# Patient Record
(demographics unavailable — no encounter records)

---

## 2024-11-22 NOTE — ASSESSMENT
[FreeTextEntry1] : Ms. Frank is a 58 year old female with R ADH s/p excision now on Tamoxifen that presents for routine follow up.  #ADH - s/p excision - Started on Tamoxifen 10 mg every other day, now taking 5mg daily. - Discussed risk model scores. - Previously followed by Dr. Yao with breast surgery, transitioning to Dr. Wilcox. Appt scheduled 7/2023 - Continue mammo alternating with MRI. - s/p mammo/sono 5/2022 with no mammographic or sonographic evidence of malignancy bilaterally. Reviewed. Due now. Ordered - s/p MRI Breast 1/2023 with no MRI evidence of malignancy, post surgical changes to R breast. Reviewed. MRI due 1/2024. Orders in place by Dr. Wilcox. - 5/26/23 patient states she is currently still menstruating and in interim has had heavy menses, bleeding for 3 mos at a time. She notes she did not notify heme/onc clinical team and continued with low dose Tamoxifen. She is s/p eval with Dr. Calderon, per patient TVUS, EMBx and D&C preformed with improvement to bleeding. Obtain records. Advised patient can be 2/2 Tamoxifen use. Review of records and pathology. Check hormone levels today to evaluate menopausal status. Advised to call office immediately with any new or worsening bleeding. May consider switching medications to AI pending menopausal status. Will need 5 years total of antiestrogens. VS and CBC reviewed today; WBC 9.41, hgb 11.9, plt 296. Anemia may be 2/2 bleeding. Continue to monitor. Check irons today. If persistent will check anemia panel next visit. - 11/15/23 vs and CBC reviewed; WBC 8.11, hgb 13.2, plt 256. s/p follow up with Dr. Wilcox 7/2023 note reviewed. s/p mammo/sono 7/2023 BIRADS 2 reviewed. Due for MRI 1/2024, order in by breast surgery. Continue Tamoxifen tolerating well.  - 5/22/24 -vs and labs reviewed. labs drawn in office today. wbc, hgb and plts wnl. cmp pending. patient requesting estradiol, fsh, lh. Did have some spotting recently after several months of not having it. Discussed TVUS given that she is on tamoxifen. She should also follow up with her GYN. For now can continue tamoxifen for breast cancer prevention - 11/2024 vs and labs reviewed; Cotinue tamoxifen. s/p mammo 7/2024. Due for MRI now had to reschedule. Has not had any spotting episodes. Maintain appropriate follow up with GYN currently she is transitioning to a new one. Reviewed role of TVUS to monitor uterine linging   #Vaginal Bleeding - As above, patient states she is currently still menstruating (has not gone one year without menses) - Episode of 3 mos of heavy bleeding. She notes she did not notify heme/onc clinical team and continued with low dose Tamoxifen. - She is s/p eval with Dr. Calderon, per patient TVUS, EMBx and D&C preformed with improvement to bleeding. Obtain records and review pathology. - Advised patient can be 2/2 Tamoxifen use. Currently improvement to bleeding and has continued to Tamoxifen. - Check hormone levels to evaluate menopausal status. - Advised to call office immediately with any new or worsening bleeding, may need to hold Tamoxifen. Consider switching medications to AI pending menopausal status. Will need 5 years total of antiestrogens. - Pending follow up appt with GYN Dr. Madrid 8/25/23 - 11/16/23 s/p Venofer 200mg x5 after last visit for severe iron deficiency 2/2 to heavy menses. LMP 7/29/23. Improvement s/p above. s/p follow up with GYN Dr. Madrid 9/2023 note reviewed. Repeat irons today  - 5/22/24 - Did have some spotting recently after several months of not having it. Discussed TVUS given that she is on tamoxifen. She should also follow up with her GYN.   - 11/2024 Has not had any spotting episodes. Maintain appropriate follow up with GYN currently she is transitioning to a new one. Reviewed role of TVUS to monitor uterine linging   #Iron deficiency  - 2/2 menorrhagia s/p TVUS, s/p EMBx, s/p D&C - s/p follow up with GYN 9/2023 note reviewed  - Repeat irons today  - last CNY in [place] before she moved about 4 years ago, per patient no hx of polyps. Will provide with GI referrals when she is due   #Diastolic Dysfunction - Continues to follow with Dr. Saeed [facility]. Scheduled appt 6/20/23.  #Eosinophilia - Hx of seasonal allergies - Continue to monitor  #Vit D deficiency  - Repeat levels today   RTC in 6 mos or sooner with any new or worsening s/s with CBC with diff, CMP, irons, b12/folate, vit D.

## 2024-11-22 NOTE — HISTORY OF PRESENT ILLNESS
[de-identified] : Ms. Harrison a 56 year old female who was in her usual state of health until she underwent a routine mammogram  2020.\par  \par  2020 Mammogram: Right breast calcifications require further diagnostic evaluation.\par  \par  10/9/2020- stereotactic biopsies by Dr. Yao, subsequently developed hematoma requiring aspiration\par  Right breast, upper central with calcifications, stereotactic biopsy-atypical ductal hyperplasia, columnar type, fibrocystic changes\par  Right breast, upper central without calcifications, stereotactic biopsy- atypical ductal hyperplasia, columnar type, fibrocystic changes\par  \par  2020 Right breast excisional breast biopsy- usual ductal hyperplasia, columnar cell change and pseudoangiomatous stromal hyperplasia (PASH)\par  \par  Age of Menarche: 15\par  LMP: 2020\par  OCP/HRT: denies\par  \par  \par  Smoke: denies\par  ETOH: rare\par  Illicit: denies\par  \par  Personal History: About 1 year ago required ICU admission for septic shock, has residual LEWIS and follows with cardiologist\par  \par  Family History:\par  Father- throat cancer, dx 60's\par  Paternal Grandfather- lung ca, dx 90's\par  denies family history of breast, ovarian, pancreatic CA\par  \par  Health Maintenance:\par  Last colonoscopy 2018- reports everything was normal \par  PAP within past year- reports everything was normal\par  \par  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 Has not had menses since 7/29/23 has not had any further spotting episodes. Remains on Tamoxifen. tolerating well. Denies fevers/chills, HA, dizziness, SOB, cough, CP, palpitations, abd pain, n/v/d, swelling to extremities, back pain, hematuria, BRBPR. Continues to follow with Dr. Wilcox, and has upcoming appt with new GYN monitoring uterine lining. Is currently due for breast MRI but needed to reschedule. s/p mammo/sono 7/2024.

## 2024-11-22 NOTE — HISTORY OF PRESENT ILLNESS
[de-identified] : Ms. Harrison a 56 year old female who was in her usual state of health until she underwent a routine mammogram  2020.\par  \par  2020 Mammogram: Right breast calcifications require further diagnostic evaluation.\par  \par  10/9/2020- stereotactic biopsies by Dr. Yao, subsequently developed hematoma requiring aspiration\par  Right breast, upper central with calcifications, stereotactic biopsy-atypical ductal hyperplasia, columnar type, fibrocystic changes\par  Right breast, upper central without calcifications, stereotactic biopsy- atypical ductal hyperplasia, columnar type, fibrocystic changes\par  \par  2020 Right breast excisional breast biopsy- usual ductal hyperplasia, columnar cell change and pseudoangiomatous stromal hyperplasia (PASH)\par  \par  Age of Menarche: 15\par  LMP: 2020\par  OCP/HRT: denies\par  \par  \par  Smoke: denies\par  ETOH: rare\par  Illicit: denies\par  \par  Personal History: About 1 year ago required ICU admission for septic shock, has residual LEWIS and follows with cardiologist\par  \par  Family History:\par  Father- throat cancer, dx 60's\par  Paternal Grandfather- lung ca, dx 90's\par  denies family history of breast, ovarian, pancreatic CA\par  \par  Health Maintenance:\par  Last colonoscopy 2018- reports everything was normal \par  PAP within past year- reports everything was normal\par  \par  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 Has not had menses since 7/29/23 has not had any further spotting episodes. Remains on Tamoxifen. tolerating well. Denies fevers/chills, HA, dizziness, SOB, cough, CP, palpitations, abd pain, n/v/d, swelling to extremities, back pain, hematuria, BRBPR. Continues to follow with Dr. Wilcox, and has upcoming appt with new GYN monitoring uterine lining. Is currently due for breast MRI but needed to reschedule. s/p mammo/sono 7/2024.

## 2024-11-22 NOTE — PHYSICAL EXAM
[Fully active, able to carry on all pre-disease performance without restriction] : Status 0 - Fully active, able to carry on all pre-disease performance without restriction [Normal] : affect appropriate [de-identified] : palpable scar tissue like nodularity to 8:00 R breast, no masses or nodules to L breast, no LAD, no skin changes or nipple changes

## 2024-11-22 NOTE — PHYSICAL EXAM
[Fully active, able to carry on all pre-disease performance without restriction] : Status 0 - Fully active, able to carry on all pre-disease performance without restriction [Normal] : affect appropriate [de-identified] : palpable scar tissue like nodularity to 8:00 R breast, no masses or nodules to L breast, no LAD, no skin changes or nipple changes

## 2025-01-15 NOTE — HISTORY OF PRESENT ILLNESS
[FreeTextEntry1] : 59yo P2 here for well woman exam. Has not had bleeding for 1 year. Still taking tamoxifen. No longer has major menopausal symptoms. Tamxifen .5 Q day. Sexually active with same person no issues. Exercises weight training 5 x a week. Cardio 1-2x a week.  Pap 9/8/2023: NILM  Mammogram / US 7/2024 BIRADS MRI [place] 10/2024, next picture in 6 months.   Colonoscopy 2019, repeat in 2026 No Hx of osteopenia in family.   11/18/2020 Right breast excisional breast biopsy- usual ductal hyperplasia, columnar cell change and pseudoangiomatous stromal hyperplasia (PASH)  Also follows with breast surgery for ADH of R breast and is on Tamoxifen. She recently had an episode of very heavy bleeding and US showed thickened EMS. She underwent D&C, Hysteroscopy with Moot[facility] / benign path showing proliferative , disordered EM, polyp- all benign.  [id number] Pelvic sono 7/07/2024 8mm.

## 2025-01-15 NOTE — HISTORY OF PRESENT ILLNESS
[FreeTextEntry1] : 59yo P2 here for well woman exam. Has not had bleeding for 1 year. Still taking tamoxifen. No longer has major menopausal symptoms. Tamxifen .5 Q day. Sexually active with same person no issues. Exercises weight training 5 x a week. Cardio 1-2x a week.  Pap 9/8/2023: NILM  Mammogram / US 7/2024 BIRADS MRI [place] 10/2024, next picture in 6 months.   Colonoscopy 2019, repeat in 2026 No Hx of osteopenia in family.   11/18/2020 Right breast excisional breast biopsy- usual ductal hyperplasia, columnar cell change and pseudoangiomatous stromal hyperplasia (PASH)  Also follows with breast surgery for ADH of R breast and is on Tamoxifen. She recently had an episode of very heavy bleeding and US showed thickened EMS. She underwent D&C, Hysteroscopy with [facility] / benign path showing proliferative , disordered EM, polyp- all benign.  [id number] Pelvic sono 7/07/2024 8mm.

## 2025-01-15 NOTE — PHYSICAL EXAM
[Chaperone Present] : A chaperone was present in the examining room during all aspects of the physical examination [81759] : A chaperone was present during the pelvic exam. [Appropriately responsive] : appropriately responsive [Alert] : alert [No Acute Distress] : no acute distress [No Lymphadenopathy] : no lymphadenopathy [Regular Rate Rhythm] : regular rate rhythm [No Murmurs] : no murmurs [Clear to Auscultation B/L] : clear to auscultation bilaterally [Soft] : soft [Non-tender] : non-tender [Non-distended] : non-distended [No HSM] : No HSM [No Lesions] : no lesions [No Mass] : no mass [Oriented x3] : oriented x3 [Examination Of The Breasts] : a normal appearance [No Masses] : no breast masses were palpable [Labia Majora] : normal [Labia Minora] : normal [Normal] : normal [Normal Position] : in a normal position [Uterine Adnexae] : normal [FreeTextEntry2] : MARIO Bergeron [Tenderness] : nontender

## 2025-01-15 NOTE — PHYSICAL EXAM
[Chaperone Present] : A chaperone was present in the examining room during all aspects of the physical examination [82006] : A chaperone was present during the pelvic exam. [Appropriately responsive] : appropriately responsive [Alert] : alert [No Acute Distress] : no acute distress [No Lymphadenopathy] : no lymphadenopathy [Regular Rate Rhythm] : regular rate rhythm [No Murmurs] : no murmurs [Clear to Auscultation B/L] : clear to auscultation bilaterally [Soft] : soft [Non-tender] : non-tender [Non-distended] : non-distended [No HSM] : No HSM [No Lesions] : no lesions [No Mass] : no mass [Oriented x3] : oriented x3 [Examination Of The Breasts] : a normal appearance [No Masses] : no breast masses were palpable [Labia Majora] : normal [Labia Minora] : normal [Normal] : normal [Normal Position] : in a normal position [Uterine Adnexae] : normal [FreeTextEntry2] : MARIO Bergeron [Tenderness] : nontender

## 2025-02-25 NOTE — REVIEW OF SYSTEMS
[Feeling Tired] : feeling tired [Abn Vaginal Bleeding] : unexplained vaginal bleeding [Anxiety] : anxiety [Negative] : Heme/Lymph

## 2025-03-04 NOTE — PHYSICAL EXAM
[Normocephalic] : normocephalic [Atraumatic] : atraumatic [Supple] : supple [No Supraclavicular Adenopathy] : no supraclavicular adenopathy [Examined in the supine and seated position] : examined in the supine and seated position [Symmetrical] : symmetrical [No dominant masses] : no dominant masses in right breast  [No dominant masses] : no dominant masses left breast [No Nipple Retraction] : no left nipple retraction [No Nipple Discharge] : no left nipple discharge [No Axillary Lymphadenopathy] : no left axillary lymphadenopathy [No Edema] : no edema [No Rashes] : no rashes [No Ulceration] : no ulceration [de-identified] : healed periareolar incision

## 2025-03-04 NOTE — CONSULT LETTER
[Dear  ___] : Dear ~JOAN, [( Thank you for referring [unfilled] for consultation for _____ )] : Thank you for referring [unfilled] for consultation for [unfilled] [Please see my note below.] : Please see my note below. [Consult Closing:] : Thank you very much for allowing me to participate in the care of this patient.  If you have any questions, please do not hesitate to contact me. [Sincerely,] : Sincerely, [DrDaniel  ___] : Dr. KAHN [FreeTextEntry3] : Citlaly Wilcox MS DO\par  Breast Surgeon\par  [facility] \[place] [address]\par

## 2025-03-04 NOTE — HISTORY OF PRESENT ILLNESS
[FreeTextEntry1] : This is a 58 year old female here for transfer of care, a former patient of Dr. Persaud. She is s/p excisional biopsy (Dr. Yao) at [facility] on 11/18/2020 for a right breast upper central lesion found on screening mammogram 9/17/2020. Pathology showed focal  ADH, UDH, and PASH. She is under the care of Dr. Noel and currently taking Tamoxifen 5 mg daily for risk reduction (started 2020).  She is at high risk for breast cancer and has had screening mammogram/ultrasounds and MRI for surveillance. She does not have a family history of breast cancer.  She has no breast complaints. She has had a D&C for thickening of uterine lining and has an appointment with a new OB Gyn in 8/23 with Dr. Madrid. She works as a clinical .  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  She does SBE. She has not noticed a change in her breast or a breast lump. She has not noticed a change in her nipple or nipple area. She has not noticed a change in the skin of the breast. She is not experiencing nipple discharge. She is not experiencing breast pain. She has not noticed a lump or lymph node under the armpit.   BREAST CANCER RISK FACTORS Menarche: 14 Date of LMP: 01/25/2024 Menopause: pre Grav: 4     Para: 2 (19, 17) Age at first live birth: 36 Nursed: yes Hysterectomy: N Oophorectomy:  N OCP: yes, years ago for a short time HRT: N  Last pap/pelvic exam: 01/15/2025 WNL  vaginal ultrasound WNL  Related family history: none Ashkenazi: Y Mastery risk assessment: BRCAPRO 10.6%  TCv7 39.6%  TCv8  41.0%   Ana  26.4% BRCA testing: N Bra size: 34A  Last mammogram: 07/10/2024               Location: [facility]  Report reviewed.                                 Images reviewed. Results: BIRADS 2 The breasts are heterogeneously dense No mammographic evidence of malignancy.  Last ultrasound: 07/10/2024                    Location: [facility] Report reviewed.                                 Images reviewed.  Results: BIRADS 2 No sonographic evidence of malignancy.  Last MRI:  01/10/2025                   Location: [facility] Report reviewed.                                Images reviewed Results: BIRADS 2 No MRI evidence of malignancy.

## 2025-03-04 NOTE — CONSULT LETTER
[Dear  ___] : Dear ~JOAN, [( Thank you for referring [unfilled] for consultation for _____ )] : Thank you for referring [unfilled] for consultation for [unfilled] [Please see my note below.] : Please see my note below. [Consult Closing:] : Thank you very much for allowing me to participate in the care of this patient.  If you have any questions, please do not hesitate to contact me. [Sincerely,] : Sincerely, [DrDaniel  ___] : Dr. KAHN [FreeTextEntry3] : Citlaly Wilcox MS DO\par  Breast Surgeon\par  [facility] \[place] [address]\par

## 2025-03-04 NOTE — PHYSICAL EXAM
[Normocephalic] : normocephalic [Atraumatic] : atraumatic [Supple] : supple [No Supraclavicular Adenopathy] : no supraclavicular adenopathy [Examined in the supine and seated position] : examined in the supine and seated position [Symmetrical] : symmetrical [No dominant masses] : no dominant masses in right breast  [No dominant masses] : no dominant masses left breast [No Nipple Retraction] : no left nipple retraction [No Nipple Discharge] : no left nipple discharge [No Axillary Lymphadenopathy] : no left axillary lymphadenopathy [No Edema] : no edema [No Rashes] : no rashes [No Ulceration] : no ulceration [de-identified] : healed periareolar incision

## 2025-03-04 NOTE — ASSESSMENT
[FreeTextEntry1] : 59 yo female with high risk reviewed her risk status  We reviewed risk reduction strategies including maintaining a BMI <25, limiting red meat intake and alcoholic beverages to 3 per week and exercise (150 min/ week low intensity or 75 min/week high intensity). And maintaining a normal vitamin D level.  plan MRI JAN 2026 plan mg/sono JULY 2025  f/u with me 1 year, gyn 6 months She knows to call or return sooner should any concerns or questions arise.

## 2025-03-04 NOTE — HISTORY OF PRESENT ILLNESS
[FreeTextEntry1] : This is a 58 year old female here for transfer of care, a former patient of Dr. Persaud. She is s/p excisional biopsy (Dr. Yao) at [facility] on 11/18/2020 for a right breast upper central lesion found on screening mammogram 9/17/2020. Pathology showed focal  ADH, UDH, and PASH. She is under the care of Dr. Noel and currently taking Tamoxifen 5 mg daily for risk reduction (started 2020).  She is at high risk for breast cancer and has had screening mammogram/ultrasounds and MRI for surveillance. She does not have a family history of breast cancer.  She has no breast complaints. She has had a D&C for thickening of uterine lining and has an appointment with a new OB Gyn in 8/23 with Dr. Madrid. She works as a clinical .  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  She does SBE. She has not noticed a change in her breast or a breast lump. She has not noticed a change in her nipple or nipple area. She has not noticed a change in the skin of the breast. She is not experiencing nipple discharge. She is not experiencing breast pain. She has not noticed a lump or lymph node under the armpit.   BREAST CANCER RISK FACTORS Menarche: 14 Date of LMP: 01/25/2024 Menopause: pre Grav: 4     Para: 2 (19, 17) Age at first live birth: 36 Nursed: yes Hysterectomy: N Oophorectomy:  N OCP: yes, years ago for a short time HRT: N  Last pap/pelvic exam: 01/15/2025 WNL  vaginal ultrasound WNL  Related family history: none Ashkenazi: Y Mastery risk assessment: BRCAPRO 10.6%  TCv7 39.6%  TCv8  41.0%   Ana  26.4% BRCA testing: N Bra size: 34A  Last mammogram: 07/10/2024               Location: [facility]  Report reviewed.                                 Images reviewed. Results: BIRADS 2 The breasts are heterogeneously dense No mammographic evidence of malignancy.  Last ultrasound: 07/10/2024                    Location: [facility] Report reviewed.                                 Images reviewed.  Results: BIRADS 2 No sonographic evidence of malignancy.  Last MRI:  01/10/2025                   Location: [facility] Report reviewed.                                Images reviewed Results: BIRADS 2 No MRI evidence of malignancy.

## 2025-05-12 NOTE — PHYSICAL EXAM
[Fully active, able to carry on all pre-disease performance without restriction] : Status 0 - Fully active, able to carry on all pre-disease performance without restriction [Normal] : affect appropriate [de-identified] : palpable scar tissue like nodularity to 8:00 R breast, no masses or nodules to L breast, no LAD, no skin changes or nipple changes

## 2025-05-12 NOTE — HISTORY OF PRESENT ILLNESS
[de-identified] : Ms. Harrison a 56 year old female who was in her usual state of health until she underwent a routine mammogram  2020.\par  \par  2020 Mammogram: Right breast calcifications require further diagnostic evaluation.\par  \par  10/9/2020- stereotactic biopsies by Dr. Yao, subsequently developed hematoma requiring aspiration\par  Right breast, upper central with calcifications, stereotactic biopsy-atypical ductal hyperplasia, columnar type, fibrocystic changes\par  Right breast, upper central without calcifications, stereotactic biopsy- atypical ductal hyperplasia, columnar type, fibrocystic changes\par  \par  2020 Right breast excisional breast biopsy- usual ductal hyperplasia, columnar cell change and pseudoangiomatous stromal hyperplasia (PASH)\par  \par  Age of Menarche: 15\par  LMP: 2020\par  OCP/HRT: denies\par  \par  \par  Smoke: denies\par  ETOH: rare\par  Illicit: denies\par  \par  Personal History: About 1 year ago required ICU admission for septic shock, has residual LEWIS and follows with cardiologist\par  \par  Family History:\par  Father- throat cancer, dx 60's\par  Paternal Grandfather- lung ca, dx 90's\par  denies family history of breast, ovarian, pancreatic CA\par  \par  Health Maintenance:\par  Last colonoscopy 2018- reports everything was normal \par  PAP within past year- reports everything was normal\par  \par  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 Has not had menses since 7/29/23 has not had any further spotting episodes. Remains on Tamoxifen. tolerating well. Denies fevers/chills, HA, dizziness, SOB, cough, CP, palpitations, abd pain, n/v/d, swelling to extremities, back pain, hematuria, BRBPR. Continues to follow with Dr. Wilcox, and has upcoming appt with new GYN monitoring uterine lining. Is currently due for breast MRI but needed to reschedule. s/p mammo/sono 7/2024.

## 2025-06-11 NOTE — HISTORY OF PRESENT ILLNESS
[de-identified] : Ms. Harrison a 56 year old female who was in her usual state of health until she underwent a routine mammogram  2020.\par  \par  2020 Mammogram: Right breast calcifications require further diagnostic evaluation.\par  \par  10/9/2020- stereotactic biopsies by Dr. Yao, subsequently developed hematoma requiring aspiration\par  Right breast, upper central with calcifications, stereotactic biopsy-atypical ductal hyperplasia, columnar type, fibrocystic changes\par  Right breast, upper central without calcifications, stereotactic biopsy- atypical ductal hyperplasia, columnar type, fibrocystic changes\par  \par  2020 Right breast excisional breast biopsy- usual ductal hyperplasia, columnar cell change and pseudoangiomatous stromal hyperplasia (PASH)\par  \par  Age of Menarche: 15\par  LMP: 2020\par  OCP/HRT: denies\par  \par  \par  Smoke: denies\par  ETOH: rare\par  Illicit: denies\par  \par  Personal History: About 1 year ago required ICU admission for septic shock, has residual LEWIS and follows with cardiologist\par  \par  Family History:\par  Father- throat cancer, dx 60's\par  Paternal Grandfather- lung ca, dx 90's\par  denies family history of breast, ovarian, pancreatic CA\par  \par  Health Maintenance:\par  Last colonoscopy 2018- reports everything was normal \par  PAP within past year- reports everything was normal\par  \par  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  Has not had menses since 01/25/2024 has not had any further spotting episodes.  Remains on Tamoxifen.  Now to follow with Dr. Barbara Collier to monitoring uterine lining. States had break out bleeding 1.5 months ago, had TUS 2 weeks ago and was normal Is currently due for breast MRI and scheduled for 01/12/26 s/p mammo/sono 7/2024. Due now

## 2025-06-11 NOTE — PHYSICAL EXAM
[de-identified] : palpable scar tissue like nodularity to 8:00 R breast, no masses or nodules to L breast, no LAD, no skin changes or nipple changes

## 2025-06-11 NOTE — PHYSICAL EXAM
[de-identified] : palpable scar tissue like nodularity to 8:00 R breast, no masses or nodules to L breast, no LAD, no skin changes or nipple changes

## 2025-06-11 NOTE — HISTORY OF PRESENT ILLNESS
[de-identified] : Ms. Harrison a 56 year old female who was in her usual state of health until she underwent a routine mammogram  2020.\par  \par  2020 Mammogram: Right breast calcifications require further diagnostic evaluation.\par  \par  10/9/2020- stereotactic biopsies by Dr. Yao, subsequently developed hematoma requiring aspiration\par  Right breast, upper central with calcifications, stereotactic biopsy-atypical ductal hyperplasia, columnar type, fibrocystic changes\par  Right breast, upper central without calcifications, stereotactic biopsy- atypical ductal hyperplasia, columnar type, fibrocystic changes\par  \par  2020 Right breast excisional breast biopsy- usual ductal hyperplasia, columnar cell change and pseudoangiomatous stromal hyperplasia (PASH)\par  \par  Age of Menarche: 15\par  LMP: 2020\par  OCP/HRT: denies\par  \par  \par  Smoke: denies\par  ETOH: rare\par  Illicit: denies\par  \par  Personal History: About 1 year ago required ICU admission for septic shock, has residual LEWIS and follows with cardiologist\par  \par  Family History:\par  Father- throat cancer, dx 60's\par  Paternal Grandfather- lung ca, dx 90's\par  denies family history of breast, ovarian, pancreatic CA\par  \par  Health Maintenance:\par  Last colonoscopy 2018- reports everything was normal \par  PAP within past year- reports everything was normal\par  \par  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  Has not had menses since 01/25/2024 has not had any further spotting episodes.  Remains on Tamoxifen.  Now to follow with Dr. Barbara Collier to monitoring uterine lining. States had break out bleeding 1.5 months ago, had TUS 2 weeks ago and was normal Is currently due for breast MRI and scheduled for 01/12/26 s/p mammo/sono 7/2024. Due now

## 2025-06-11 NOTE — ASSESSMENT
[Patient/Caregiver unclear of wishes] : Patient/Caregiver unclear of wishes [Full Code] : full code [FreeTextEntry1] : Ms. Frank is a 58 year old female with R ADH s/p excision now on Tamoxifen that presents for routine follow up.  #ADH - s/p excision - Started on Tamoxifen 10 mg every other day, now taking 5mg daily. - Discussed risk model scores. - continue with imaging q 6 months alternating between mammo/sono and MRI. Scheduled for mammo/sono 7/2025 and MRI 1/2025, -she will complete 5 years in 11/2025 - checking DEXA if osteopenia may consider raloxifene. I have discussed with the patient that I have no data to support extending past 5 years for chemoprevention however will discuss again in 6 months  #Vaginal Bleeding - resolved -had biopsy - inactive endometrium and polyp -follows with Dr. Padgett -continue TVUS  #Iron deficiency  - 2/2 menorrhagia s/p TVUS, s/p EMBx, s/p D&C - s/p follow up with GYN 9/2023 note reviewed  - Repeat irons today  - last CNY in [place] before she moved about 4 years ago, per patient no hx of polyps. Will provide with GI referrals 2029  #Vit D deficiency  - Repeat levels today   RTC in 6 mos or sooner with any new or worsening s/s with CBC with diff, CMP, irons, b12/folate, vit D. [AdvancecareDate] : 06/11/25

## 2025-06-11 NOTE — PHYSICAL EXAM
[de-identified] : palpable scar tissue like nodularity to 8:00 R breast, no masses or nodules to L breast, no LAD, no skin changes or nipple changes

## 2025-06-11 NOTE — ASSESSMENT
[Patient/Caregiver unclear of wishes] : Patient/Caregiver unclear of wishes [Full Code] : full code [FreeTextEntry1] : Ms. Frank is a 58 year old female with R ADH s/p excision now on Tamoxifen that presents for routine follow up.  #ADH - s/p excision - Started on Tamoxifen 10 mg every other day, now taking 5mg daily. - Discussed risk model scores. - continue with imaging q 6 months alternating between mammo/sono and MRI. Scheduled for mammo/sono 7/2025 and MRI 1/2025, -she will complete 5 years in 11/2025 - checking DEXA if osteopenia may consider raloxifene. I have discussed with the patient that I have no data to support extending past 5 years for chemoprevention however will discuss again in 6 months  #Vaginal Bleeding - resolved -had biopsy - inactive endometrium and polyp -follows with Dr. Padgett -continue TVUS  #Iron deficiency  - 2/2 menorrhagia s/p TVUS, s/p EMBx, s/p D&C - s/p follow up with GYN 9/2023 note reviewed  - Repeat irons today  - last CNY in [place] before she moved about 4 years ago, per patient no hx of polyps. Will provide with GI referrals 2029  #Vit D deficiency  - Repeat levels today   RTC in 6 mos or sooner with any new or worsening s/s with CBC with diff, CMP, irons, b12/folate, vit D. [AdvancecareDate] : 06/11/25 none

## 2025-06-11 NOTE — BEGINNING OF VISIT
[0] : 2) Feeling down, depressed, or hopeless: Not at all (0) [PHQ-2 Negative] : PHQ-2 Negative [Never] : Never [Date Discussed (MM/DD/YY): ___] : Discussed: [unfilled] [Patient/Caregiver unclear of wishes] : Patient/Caregiver unclear of wishes [[id number]] : 0

## 2025-06-11 NOTE — HISTORY OF PRESENT ILLNESS
[de-identified] : Ms. Harrison a 56 year old female who was in her usual state of health until she underwent a routine mammogram  2020.\par  \par  2020 Mammogram: Right breast calcifications require further diagnostic evaluation.\par  \par  10/9/2020- stereotactic biopsies by Dr. Yao, subsequently developed hematoma requiring aspiration\par  Right breast, upper central with calcifications, stereotactic biopsy-atypical ductal hyperplasia, columnar type, fibrocystic changes\par  Right breast, upper central without calcifications, stereotactic biopsy- atypical ductal hyperplasia, columnar type, fibrocystic changes\par  \par  2020 Right breast excisional breast biopsy- usual ductal hyperplasia, columnar cell change and pseudoangiomatous stromal hyperplasia (PASH)\par  \par  Age of Menarche: 15\par  LMP: 2020\par  OCP/HRT: denies\par  \par  \par  Smoke: denies\par  ETOH: rare\par  Illicit: denies\par  \par  Personal History: About 1 year ago required ICU admission for septic shock, has residual LEWIS and follows with cardiologist\par  \par  Family History:\par  Father- throat cancer, dx 60's\par  Paternal Grandfather- lung ca, dx 90's\par  denies family history of breast, ovarian, pancreatic CA\par  \par  Health Maintenance:\par  Last colonoscopy 2018- reports everything was normal \par  PAP within past year- reports everything was normal\par  \par  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  Has not had menses since 01/25/2024 has not had any further spotting episodes.  Remains on Tamoxifen.  Now to follow with Dr. Barbara Collier to monitoring uterine lining. States had break out bleeding 1.5 months ago, had TUS 2 weeks ago and was normal Is currently due for breast MRI and scheduled for 01/12/26 s/p mammo/sono 7/2024. Due now

## 2025-06-11 NOTE — HISTORY OF PRESENT ILLNESS
[de-identified] : Ms. Harrison a 56 year old female who was in her usual state of health until she underwent a routine mammogram  2020.\par  \par  2020 Mammogram: Right breast calcifications require further diagnostic evaluation.\par  \par  10/9/2020- stereotactic biopsies by Dr. Yao, subsequently developed hematoma requiring aspiration\par  Right breast, upper central with calcifications, stereotactic biopsy-atypical ductal hyperplasia, columnar type, fibrocystic changes\par  Right breast, upper central without calcifications, stereotactic biopsy- atypical ductal hyperplasia, columnar type, fibrocystic changes\par  \par  2020 Right breast excisional breast biopsy- usual ductal hyperplasia, columnar cell change and pseudoangiomatous stromal hyperplasia (PASH)\par  \par  Age of Menarche: 15\par  LMP: 2020\par  OCP/HRT: denies\par  \par  \par  Smoke: denies\par  ETOH: rare\par  Illicit: denies\par  \par  Personal History: About 1 year ago required ICU admission for septic shock, has residual LEWIS and follows with cardiologist\par  \par  Family History:\par  Father- throat cancer, dx 60's\par  Paternal Grandfather- lung ca, dx 90's\par  denies family history of breast, ovarian, pancreatic CA\par  \par  Health Maintenance:\par  Last colonoscopy 2018- reports everything was normal \par  PAP within past year- reports everything was normal\par  \par  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  Has not had menses since 01/25/2024 has not had any further spotting episodes.  Remains on Tamoxifen.  Now to follow with Dr. Barbara Collier to monitoring uterine lining. States had break out bleeding 1.5 months ago, had TUS 2 weeks ago and was normal Is currently due for breast MRI and scheduled for 01/12/26 s/p mammo/sono 7/2024. Due now

## 2025-06-11 NOTE — BEGINNING OF VISIT
[0] : 2) Feeling down, depressed, or hopeless: Not at all (0) [PHQ-2 Negative] : PHQ-2 Negative [Never] : Never [Date Discussed (MM/DD/YY): ___] : Discussed: [unfilled] [Patient/Caregiver unclear of wishes] : Patient/Caregiver unclear of wishes [[id number]] : 0

## 2025-06-11 NOTE — BEGINNING OF VISIT
[0] : 2) Feeling down, depressed, or hopeless: Not at all (0) [PHQ-2 Negative] : PHQ-2 Negative [Never] : Never [Date Discussed (MM/DD/YY): ___] : Discussed: [unfilled] [Patient/Caregiver unclear of wishes] : Patient/Caregiver unclear of wishes [[id number]] : 0

## 2025-06-11 NOTE — BEGINNING OF VISIT
[0] : 2) Feeling down, depressed, or hopeless: Not at all (0) [PHQ-2 Negative] : PHQ-2 Negative [Never] : Never [Date Discussed (MM/DD/YY): ___] : Discussed: [unfilled] [Patient/Caregiver unclear of wishes] : Patient/Caregiver unclear of wishes [[id number]] : 0

## 2025-06-11 NOTE — PHYSICAL EXAM
[de-identified] : palpable scar tissue like nodularity to 8:00 R breast, no masses or nodules to L breast, no LAD, no skin changes or nipple changes